# Patient Record
Sex: FEMALE | Race: WHITE | Employment: UNEMPLOYED | URBAN - METROPOLITAN AREA
[De-identification: names, ages, dates, MRNs, and addresses within clinical notes are randomized per-mention and may not be internally consistent; named-entity substitution may affect disease eponyms.]

---

## 2017-06-06 ENCOUNTER — DOCUMENTATION ONLY (OUTPATIENT)
Dept: PAIN MANAGEMENT | Age: 47
End: 2017-06-06

## 2017-06-06 NOTE — PROGRESS NOTES
Request for records from patient to send re cords to Dr. Su Park office and fax request to [employer] to be process on 06/06/2017.

## 2020-08-18 ENCOUNTER — OFFICE VISIT (OUTPATIENT)
Dept: ORTHOPEDIC SURGERY | Age: 50
End: 2020-08-18

## 2020-08-18 VITALS
DIASTOLIC BLOOD PRESSURE: 69 MMHG | SYSTOLIC BLOOD PRESSURE: 115 MMHG | BODY MASS INDEX: 22.71 KG/M2 | HEART RATE: 74 BPM | WEIGHT: 128.2 LBS | OXYGEN SATURATION: 95 % | HEIGHT: 63 IN | TEMPERATURE: 97.7 F

## 2020-08-18 DIAGNOSIS — M50.20 HNP (HERNIATED NUCLEUS PULPOSUS), CERVICAL: ICD-10-CM

## 2020-08-18 DIAGNOSIS — M54.12 CERVICAL RADICULOPATHY: Primary | ICD-10-CM

## 2020-08-18 RX ORDER — PREGABALIN 150 MG/1
150 CAPSULE ORAL 2 TIMES DAILY
Qty: 60 CAP | Refills: 2 | Status: SHIPPED | OUTPATIENT
Start: 2020-08-18 | End: 2020-09-24 | Stop reason: SDUPTHER

## 2020-08-18 RX ORDER — PROMETHAZINE HYDROCHLORIDE 12.5 MG/1
TABLET ORAL
COMMUNITY

## 2020-08-18 RX ORDER — AMITRIPTYLINE HYDROCHLORIDE 25 MG/1
75 TABLET, FILM COATED ORAL
Qty: 90 TAB | Refills: 2 | Status: SHIPPED | OUTPATIENT
Start: 2020-08-18 | End: 2020-09-21 | Stop reason: CLARIF

## 2020-08-18 RX ORDER — PREDNISONE 5 MG/1
TABLET ORAL SEE ADMIN INSTRUCTIONS
COMMUNITY
End: 2020-09-21 | Stop reason: CLARIF
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
## 2020-08-21 ENCOUNTER — OFFICE VISIT (OUTPATIENT)
Dept: ORTHOPEDIC SURGERY | Age: 50
End: 2020-08-21

## 2020-08-21 VITALS
WEIGHT: 128 LBS | RESPIRATION RATE: 16 BRPM | HEART RATE: 70 BPM | DIASTOLIC BLOOD PRESSURE: 68 MMHG | TEMPERATURE: 98.5 F | BODY MASS INDEX: 22.67 KG/M2 | SYSTOLIC BLOOD PRESSURE: 119 MMHG

## 2020-08-21 DIAGNOSIS — M48.02 CERVICAL SPINAL STENOSIS: ICD-10-CM

## 2020-08-21 DIAGNOSIS — M50.20 HNP (HERNIATED NUCLEUS PULPOSUS), CERVICAL: Primary | ICD-10-CM

## 2020-08-21 DIAGNOSIS — M54.12 CERVICAL RADICULOPATHY: ICD-10-CM

## 2020-08-21 NOTE — LETTER
8/21/20 Patient: Miguel Shankar YOB: 1970 Date of Visit: 8/21/2020 Katie Mcdonald MD 
[address] [facility] [address] [address] VIA Facsimile: [phone] Dear Katie Mcdonald MD, Thank you for referring Ms. Olimpia Fernandez to [facility] for evaluation. My notes for this consultation are attached. If you have questions, please do not hesitate to call me. I look forward to following your patient along with you.  
 
 
Sincerely, 
 
Sergei Hernandez MD

## 2020-08-21 NOTE — PATIENT INSTRUCTIONS
Stopping Smoking: Care Instructions Your Care Instructions Cigarette smokers crave the nicotine in cigarettes. Giving it up is much harder than simply changing a habit. Your body has to stop craving the nicotine. It is hard to quit, but you can do it. There are many tools that people use to quit smoking. You may find that combining tools works best for you. There are several steps to quitting. First you get ready to quit. Then you get support to help you. After that, you learn new skills and behaviors to become a nonsmoker. For many people, a necessary step is getting and using medicine. Your doctor will help you set up the plan that best meets your needs. You may want to attend a smoking cessation program to help you quit smoking. When you choose a program, look for one that has proven success. Ask your doctor for ideas. You will greatly increase your chances of success if you take medicine as well as get counseling or join a cessation program. 
Some of the changes you feel when you first quit tobacco are uncomfortable. Your body will miss the nicotine at first, and you may feel short-tempered and grumpy. You may have trouble sleeping or concentrating. Medicine can help you deal with these symptoms. You may struggle with changing your smoking habits and rituals. The last step is the tricky one: Be prepared for the smoking urge to continue for a time. This is a lot to deal with, but keep at it. You will feel better. Follow-up care is a key part of your treatment and safety. Be sure to make and go to all appointments, and call your doctor if you are having problems. It's also a good idea to know your test results and keep a list of the medicines you take. How can you care for yourself at home? · Ask your family, friends, and coworkers for support. You have a better chance of quitting if you have help and support.  
· Join a support group, such as Nicotine Anonymous, for people who are trying to quit smoking. · Consider signing up for a smoking cessation program, such as the American Lung Association's Freedom from Smoking program. 
· Get text messaging support. Go to the website at www.smokefree. gov to sign up for the [facility] program. 
· Set a quit date. Pick your date carefully so that it is not right in the middle of a big deadline or stressful time. Once you quit, do not even take a puff. Get rid of all ashtrays and lighters after your last cigarette. Clean your house and your clothes so that they do not smell of smoke. · Learn how to be a nonsmoker. Think about ways you can avoid those things that make you reach for a cigarette. ? Avoid situations that put you at greatest risk for smoking. For some people, it is hard to have a drink with friends without smoking. For others, they might skip a coffee break with coworkers who smoke. ? Change your daily routine. Take a different route to work or eat a meal in a different place. · Cut down on stress. Calm yourself or release tension by doing an activity you enjoy, such as reading a book, taking a hot bath, or gardening. · Talk to your doctor or pharmacist about nicotine replacement therapy, which replaces the nicotine in your body. You still get nicotine but you do not use tobacco. Nicotine replacement products help you slowly reduce the amount of nicotine you need. These products come in several forms, many of them available over-the-counter: ? Nicotine patches ? Nicotine gum and lozenges ? Nicotine inhaler · Ask your doctor about bupropion (Wellbutrin) or varenicline (Chantix), which are prescription medicines. They do not contain nicotine. They help you by reducing withdrawal symptoms, such as stress and anxiety. · Some people find hypnosis, acupuncture, and massage helpful for ending the smoking habit. · Eat a healthy diet and get regular exercise. Having healthy habits will help your body move past its craving for nicotine. · Be prepared to keep trying. Most people are not successful the first few times they try to quit. Do not get mad at yourself if you smoke again. Make a list of things you learned and think about when you want to try again, such as next week, next month, or next year. Where can you learn more? Go to http://davon-joanne.info/ Enter K617 in the search box to learn more about \"Stopping Smoking: Care Instructions. \" Current as of: March 12, 2020               Content Version: 12.5 © 8054-4935 [employer]. Care instructions adapted under license by [employer] (which disclaims liability or warranty for this information). If you have questions about a medical condition or this instruction, always ask your healthcare professional. [address] any warranty or liability for your use of this information. Learning About How to Have a Healthy Back What causes back pain? Back pain is often caused by overuse, strain, or injury. For example, people often hurt their backs playing sports or working in the yard, being jolted in a car accident, or lifting something too heavy. Aging plays a part too. Your bones and muscles tend to lose strength as you age, which makes injury more likely. The spongy discs between the bones of the spine (vertebrae) may suffer from wear and tear and no longer provide enough cushion between the bones. A disc that bulges or breaks open (herniated disc) can press on nerves, causing back pain. In some people, back pain is the result of arthritis, broken vertebrae caused by bone loss (osteoporosis), illness, or a spine problem. Although most people have back pain at one time or another, there are steps you can take to make it less likely. How can you have a healthy back? Reduce stress on your back through good posture Slumping or slouching alone may not cause low back pain.  But after the back has been strained or injured, bad posture can make pain worse. · Sleep in a position that maintains your back's normal curves and on a mattress that feels comfortable. Sleep on your side with a pillow between your knees, or sleep on your back with a pillow under your knees. These positions can reduce strain on your back. · Stand and sit up straight. \"Good posture\" generally means your ears, shoulders, and hips are in a straight line. · If you must stand for a long time, put one foot on a stool, ledge, or box. Switch feet every now and then. · Sit in a chair that is low enough to let you place both feet flat on the floor with both knees nearly level with your hips. If your chair or desk is too high, use a footrest to raise your knees. Place a small pillow, a rolled-up towel, or a lumbar roll in the curve of your back if you need extra support. · Try a kneeling chair, which helps tilt your hips forward. This takes pressure off your lower back. · Try sitting on an exercise ball. It can rock from side to side, which helps keep your back loose. · When driving, keep your knees nearly level with your hips. Sit straight, and drive with both hands on the steering wheel. Your arms should be in a slightly bent position. Reduce stress on your back through careful lifting · Squat down, bending at the hips and knees only. If you need to, put one knee to the floor and extend your other knee in front of you, bent at a right angle (half kneeling). · Press your chest straight forward. This helps keep your upper back straight while keeping a slight arch in your low back. · Hold the load as close to your body as possible, at the level of your belly button (navel). · Use your feet to change direction, taking small steps. · Lead with your hips as you change direction. Keep your shoulders in line with your hips as you move. · Set down your load carefully, squatting with your knees and hips only. Exercise and stretch your back · Do some exercise on most days of the week, if your doctor says it is okay. You can walk, run, swim, or cycle. · Stretch your back muscles. Here are a few exercises to try: 
? Lie on your back, and gently pull one bent knee to your chest. Put that foot back on the floor, and then pull the other knee to your chest. 
? Do pelvic tilts. Lie on your back with your knees bent. Tighten your stomach muscles. Pull your belly button (navel) in and up toward your ribs. You should feel like your back is pressing to the floor and your hips and pelvis are slightly lifting off the floor. Hold for 6 seconds while breathing smoothly. ? Sit with your back flat against a wall. · Keep your core muscles strong. The muscles of your back, belly (abdomen), and buttocks support your spine. ? Pull in your belly and imagine pulling your navel toward your spine. Hold this for 6 seconds, then relax. Remember to keep breathing normally as you tense your muscles. ? Do curl-ups. Always do them with your knees bent. Keep your low back on the floor, and curl your shoulders toward your knees using a smooth, slow motion. Keep your arms folded across your chest. If this bothers your neck, try putting your hands behind your neck (not your head), with your elbows spread apart. ? Lie on your back with your knees bent and your feet flat on the floor. Tighten your belly muscles, and then push with your feet and raise your buttocks up a few inches. Hold this position 6 seconds as you continue to breathe normally, then lower yourself slowly to the floor. Repeat 8 to 12 times. ? If you like group exercise, try Pilates or yoga. These classes have poses that strengthen the core muscles. Lead a healthy lifestyle · Stay at a healthy weight to avoid strain on your back. · Do not smoke. Smoking increases the risk of osteoporosis, which weakens the spine.  If you need help quitting, talk to your doctor about stop-smoking programs and medicines. These can increase your chances of quitting for good. Where can you learn more? Go to http://davon-joanne.info/ Enter L315 in the search box to learn more about \"Learning About How to Have a Healthy Back. \" Current as of: March 2, 2020               Content Version: 12.5 © 3823-2355 [employer]. Care instructions adapted under license by [employer] (which disclaims liability or warranty for this information). If you have questions about a medical condition or this instruction, always ask your healthcare professional. [address] any warranty or liability for your use of this information.
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
## 2020-08-24 ENCOUNTER — TELEPHONE (OUTPATIENT)
Dept: ORTHOPEDIC SURGERY | Age: 50
End: 2020-08-24

## 2020-08-24 NOTE — TELEPHONE ENCOUNTER
Pt is on lyrica and is not doing well on it. She would like something else.  She is not schedule for sx yet due to her  having to fill out financial assist paper work

## 2020-08-24 NOTE — TELEPHONE ENCOUNTER
Per pt, the Lyrica makes her feel drunk and she is in a lot of pain. She can not be scheduled for surgery yet due to not having insurance.

## 2020-08-24 NOTE — TELEPHONE ENCOUNTER
What does not doing well mean? Is she having side effects, if so what side effects? Is it just not working, if so she needs to give it more time.

## 2020-08-25 NOTE — TELEPHONE ENCOUNTER
Pt instructed to stop the Lyrica. Pt has been taking the Elavil with no relief. She has tried over the counter meds with no benefit. Her pain is excruciating. She has sent in the paperwork for financial assistance to be approved for surgery. Please advise.

## 2020-08-25 NOTE — TELEPHONE ENCOUNTER
The pt is still feeling that way from 1 pill. It has been this way since she started taking it a week ago.

## 2020-08-26 NOTE — TELEPHONE ENCOUNTER
We can try topamax ramp if she wants to try that. Can also increase elavil to 100 mg q hs. Make sure she does not have glaucoma.

## 2020-08-26 NOTE — TELEPHONE ENCOUNTER
Pt would like to try the Topamax. She would like it to go to the pharmacy on file. No history of glaucoma.

## 2020-08-27 RX ORDER — TOPIRAMATE 25 MG/1
TABLET ORAL
Qty: 90 TAB | Refills: 1 | Status: SHIPPED | OUTPATIENT
Start: 2020-08-27 | End: 2020-09-21

## 2020-09-01 ENCOUNTER — TELEPHONE (OUTPATIENT)
Dept: ORTHOPEDIC SURGERY | Age: 50
End: 2020-09-01

## 2020-09-01 NOTE — TELEPHONE ENCOUNTER
I called and spoke to the pt. The pt was identified using 2 pt identifiers. She was asked if she has taken cymbalta before. The pt confirmed that she has also tried cymbalta. Message will be routed back to the provider for review. The pt will be contacted once the provider has responded. The pt is aware.

## 2020-09-01 NOTE — TELEPHONE ENCOUNTER
[address] [address]es we can offer her a mild muscle relaxer if she would like, she is supposed to be getting surgery has she been scheduled for that yet?

## 2020-09-01 NOTE — TELEPHONE ENCOUNTER
Pt states dr Jocy Foster prescribed nerve medications and she cannot use them--reports twitching , falling dizziness.     Pt cannot tell me the names of her medications    Please call the patient to discuss at p#[phone]    Pharmacy: [facility]

## 2020-09-02 NOTE — TELEPHONE ENCOUNTER
We do not Rx opioids pre-operatively for neuropathic pain as opioids are not recommended to Tx nerve pain, the board of medicine prohibits us from doing so, we can offer her anti-neuritics in higher doses which would be Lyrica 225mg BID

## 2020-09-02 NOTE — TELEPHONE ENCOUNTER
I called and spoke to the pt. The provider's message was verbalized to the pt. She verbalized understanding and has no further questions or concerns at this time. She states that she will contact her pcp.

## 2020-09-02 NOTE — TELEPHONE ENCOUNTER
I called and spoke to Ms. Bebeto Calero. The pt was identified using 2 pt identifiers. She was informed that the provider can offer her a mild muscle relaxer if she would like. The pt states she already has flexeril. She was asked if she has scheduled her surgery yet. The pt states that she has been approved for medicaid but is waiting for her card to come in the mail. Once she gets the card number, she can call Gaurang Hernandez and get scheduled for the surgery. The pt voiced her frustration with the office and not getting back to her in timely manners. She is also upset that her pain is not being managed since it is necessary for her to have surgery. She states that the only thing that has worked for her is percocet that her pcp was giving her. They will no longer provide this to her because she has been seen here by the surgeon and they should be giving it to her. The pt was notified that her message will be forwarded to the provider for review. I spoke to the pt yesterday regarding her concerns and apologized to her if she feels like we are not getting back to her in a timely manner. She will be contacted once the provider has had a chance to review the message and respond.

## 2020-09-14 ENCOUNTER — HOSPITAL ENCOUNTER (OUTPATIENT)
Dept: LAB | Age: 50
Discharge: HOME | End: 2020-09-14

## 2020-09-14 ENCOUNTER — HOSPITAL ENCOUNTER (OUTPATIENT)
Dept: PREADMISSION TESTING | Age: 50
Discharge: HOME | End: 2020-09-14
Payer: MEDICAID

## 2020-09-14 DIAGNOSIS — M54.12 CERVICAL RADICULOPATHY: ICD-10-CM

## 2020-09-14 DIAGNOSIS — M48.02 CERVICAL SPINAL STENOSIS: ICD-10-CM

## 2020-09-14 DIAGNOSIS — M50.20 HNP (HERNIATED NUCLEUS PULPOSUS), CERVICAL: ICD-10-CM

## 2020-09-14 LAB
ALBUMIN SERPL-MCNC: 3.2 G/DL (ref 3.4–5)
ALBUMIN/GLOB SERPL: 1.1 {RATIO} (ref 0.8–1.7)
ALP SERPL-CCNC: 164 U/L (ref 45–117)
ALT SERPL-CCNC: 22 U/L (ref 13–56)
ANION GAP SERPL CALC-SCNC: 4 MMOL/L (ref 3–18)
AST SERPL-CCNC: 18 U/L (ref 10–38)
BILIRUB SERPL-MCNC: 0.2 MG/DL (ref 0.2–1)
BUN SERPL-MCNC: 10 MG/DL (ref 7–18)
BUN/CREAT SERPL: 19 (ref 12–20)
CALCIUM SERPL-MCNC: 8.5 MG/DL (ref 8.5–10.1)
CHLORIDE SERPL-SCNC: 106 MMOL/L (ref 100–111)
CO2 SERPL-SCNC: 31 MMOL/L (ref 21–32)
CREAT SERPL-MCNC: 0.53 MG/DL (ref 0.6–1.3)
ERYTHROCYTE [DISTWIDTH] IN BLOOD BY AUTOMATED COUNT: 13 % (ref 11.6–14.5)
GLOBULIN SER CALC-MCNC: 3 G/DL (ref 2–4)
GLUCOSE SERPL-MCNC: 83 MG/DL (ref 74–99)
HCT VFR BLD AUTO: 39.7 % (ref 35–45)
HGB BLD-MCNC: 13.1 G/DL (ref 12–16)
MCH RBC QN AUTO: 29.6 PG (ref 24–34)
MCHC RBC AUTO-ENTMCNC: 33 G/DL (ref 31–37)
MCV RBC AUTO: 89.8 FL (ref 74–97)
PLATELET # BLD AUTO: 188 K/UL (ref 135–420)
PMV BLD AUTO: 11.3 FL (ref 9.2–11.8)
POTASSIUM SERPL-SCNC: 3.9 MMOL/L (ref 3.5–5.5)
PROT SERPL-MCNC: 6.2 G/DL (ref 6.4–8.2)
RBC # BLD AUTO: 4.42 M/UL (ref 4.2–5.3)
SODIUM SERPL-SCNC: 141 MMOL/L (ref 136–145)
WBC # BLD AUTO: 10 K/UL (ref 4.6–13.2)

## 2020-09-14 PROCEDURE — 80053 COMPREHEN METABOLIC PANEL: CPT

## 2020-09-14 PROCEDURE — 93005 ELECTROCARDIOGRAM TRACING: CPT

## 2020-09-14 PROCEDURE — 85027 COMPLETE CBC AUTOMATED: CPT

## 2020-09-14 PROCEDURE — 36415 COLL VENOUS BLD VENIPUNCTURE: CPT

## 2020-09-15 LAB
ATRIAL RATE: 80 BPM
CALCULATED P AXIS, ECG09: 27 DEGREES
CALCULATED R AXIS, ECG10: 59 DEGREES
CALCULATED T AXIS, ECG11: 51 DEGREES
DIAGNOSIS, 93000: NORMAL
P-R INTERVAL, ECG05: 150 MS
Q-T INTERVAL, ECG07: 364 MS
QRS DURATION, ECG06: 70 MS
QTC CALCULATION (BEZET), ECG08: 419 MS
VENTRICULAR RATE, ECG03: 80 BPM

## 2020-09-18 ENCOUNTER — HOSPITAL ENCOUNTER (OUTPATIENT)
Dept: PREADMISSION TESTING | Age: 50
Discharge: HOME | End: 2020-09-18
Payer: MEDICAID

## 2020-09-18 ENCOUNTER — HOSPITAL ENCOUNTER (OUTPATIENT)
Dept: GENERAL RADIOLOGY | Age: 50
Discharge: HOME | End: 2020-09-18
Payer: MEDICAID

## 2020-09-18 DIAGNOSIS — M48.02 CERVICAL SPINAL STENOSIS: ICD-10-CM

## 2020-09-18 DIAGNOSIS — Z01.818 OTHER SPECIFIED PRE-OPERATIVE EXAMINATION: ICD-10-CM

## 2020-09-18 DIAGNOSIS — M50.20 HNP (HERNIATED NUCLEUS PULPOSUS), CERVICAL: ICD-10-CM

## 2020-09-18 DIAGNOSIS — M54.12 CERVICAL RADICULOPATHY: ICD-10-CM

## 2020-09-18 PROCEDURE — 71046 X-RAY EXAM CHEST 2 VIEWS: CPT

## 2020-09-18 PROCEDURE — 36415 COLL VENOUS BLD VENIPUNCTURE: CPT

## 2020-09-18 PROCEDURE — 87635 SARS-COV-2 COVID-19 AMP PRB: CPT

## 2020-09-19 LAB — SARS-COV-2, COV2NT: NOT DETECTED

## 2020-09-21 RX ORDER — OXYCODONE AND ACETAMINOPHEN 5; 325 MG/1; MG/1
1 TABLET ORAL 2 TIMES DAILY
COMMUNITY

## 2020-09-22 ENCOUNTER — ANESTHESIA EVENT (OUTPATIENT)
Dept: SURGERY | Age: 50
End: 2020-09-22
Payer: MEDICAID

## 2020-09-22 NOTE — H&P
Pre-Admission History and Physical    Patient: Nicol Mayorga   MRN: [id number]   SSN: [id number]   YOB: 1970   Age: 53 y.o. Sex: female     Patient scheduled for: C5/6 Disc Arthroplasty. Date of surgery: 9/23/20. [facility]  [facility]. Surgeon: Jessica Cisneros MD    HPI:  Nicol Mayorga is a 53 y.o. female with 3 months of severe progressive pain numbness and weakness in her left arm she also notes loss of balance and ambulation issues. Patient is a 1 pack a day smoker she works as a . No particular instigating trauma. Physical exam demonstrates global weakness in the left upper extremity worse in her biceps and triceps. About 3+ out of 5. No reflex change very antalgic range of motion in her neck with radiating pain with flexion or extension. MRI demonstrates a large extruded sequestered disc herniation at C5-6 on the left causing cord compression and obliteration of the lateral recess. .       She reports a pain level of 9/10. This patient has failed the presurgical conservative treatments  including medications. Pain has impacted the patient's functional ability to work, function and use her arm and she is being admitted for surgical intervention.          Past Medical History:   Diagnosis Date    Anxiety     Arthritis     Constipation     Depression 2014    Herniated disc     Poison oak 3/2013    to [facility]     Social History     Socioeconomic History    Marital status: SINGLE     Spouse name: Not on file    Number of children: Not on file    Years of education: Not on file    Highest education level: Not on file   Tobacco Use    Smoking status: Current Every Day Smoker     Packs/day: 1.00     Years: 30.00     Pack years: 30.00    Smokeless tobacco: Never Used   Substance and Sexual Activity    Alcohol use: Never     Alcohol/week: 0.0 standard drinks     Frequency: Never    Drug use: No    Sexual activity: Never   Other Topics Concern   Service No    Blood Transfusions No    Caffeine Concern No    Occupational Exposure No    Hobby Hazards No    Sleep Concern Yes    Stress Concern Yes    Weight Concern Yes    Special Diet Yes    Back Care Yes    Exercise No    Bike Helmet No    Seat Belt Yes    Self-Exams No     Past Surgical History:   Procedure Laterality Date    EXPLORATORY OF ABDOMEN      cervix x4     HX ANKLE FRACTURE TX Right     HX BREAST AUGMENTATION  1996    HX ORTHOPAEDIC      rt ankle fracture    HX OTHER SURGICAL      laproscopy     Family History   Problem Relation Age of Onset    Diabetes Mother     Headache Mother     Depression Mother     Diabetes Father     Heart Attack Father      Allergies   Allergen Reactions    Midodrine Nausea and Vomiting     PATIENT DENIES    Morphine Itching     PATIENT DENIES    Naproxen Hives     PATIENT DENIES    Oxycodone Nausea and Vomiting     Patient states she is not allergic to this medication    Pcn [Penicillins] Hives and Rash    Sulfa (Sulfonamide Antibiotics) Nausea and Vomiting     PATIENT DENIES     Current Outpatient Medications   Medication Sig Dispense Refill    oxyCODONE-acetaminophen (Percocet) 5-325 mg per tablet Take 1 Tab by mouth two (2) times a day. PRN      pregabalin (Lyrica) 150 mg capsule Take 1 Cap by mouth two (2) times a day. Max Daily Amount: 300 mg. 60 Cap 2    promethazine (PHENERGAN) 12.5 mg tablet Take  by mouth every six (6) hours as needed for Nausea. ROS:  Denies chills, fever,night sweats,  bowel or bladder dysfunction, unexplained weight loss/weight gain, chest pain, sob or anxiety.     Physical Examination    Gen: Well developed, well nourished 53 y.o. female Visit Vitals  /68 (BP 1 Location: Left arm, BP Patient Position: Sitting)   Pulse 70   Temp 98.5 °F (36.9 °C) (Temporal)   Resp 16   Wt 128 lb (58.1 kg)   BMI 22.67 kg/m²    PAIN SCALE: 9/10     CONSTITUTIONAL: The patient is in no apparent distress and is alert and oriented x 3. HEENT: Normocephalic. Hearing grossly intact. NECK: Supple and symmetric. no tenderness, or masses were felt. RESPIRATORY: No labored breathing. CARDIOVASCULAR: The carotid pulses were normal. Peripheral pulses were 2+. CHEST: Normal AP diameter and normal contour without any kyphoscoliosis. LYMPHATIC: No lymphadenopathy was appreciated in the neck, axillae or groin. SKIN:  Negative for scars, rashes, lesions, or ulcers on the right upper, right lower, left upper, left lower and trunk. NEUROLOGICAL: Alert and oriented x 3. Ambulation without assistive device. FWB. Imbalance. EXTREMITIES:  See musculoskeletal.  MUSCULOSKELETAL:  · Head and Neck: Neck pain radiating to LUE. Painful ROM. Negative for misalignment, asymmetry, crepitation, defects, tenderness masses or effusions. · Left Upper Extremity: Anterior arm pain. Weakness in . 3+ weakness biceps and triceps. Inspection, percussion and palpation performed. [place]s sign is negative. · Right Upper Extremity: Inspection, percussion and palpation performed. [place]s sign is negative. · Spine, Ribs and Pelvis: Inspection, percussion and palpation performed. Negative for misalignment, asymmetry, crepitation, defects, tenderness masses or effusions. · Left Lower Extremity: Inspection, percussion and palpation performed. Negative straight leg raise. · Right Lower Extremity: Inspection, percussion and palpation performed. Negative straight leg raise.        SPINE EXAM:      Cervical spine: Neck is midline. Normal muscle tone. No focal atrophy is noted.     Lumbar spine: No rash, ecchymosis, or gross obliquity. No focal atrophy is noted    Assessment and Plan    Due to the pt's persistent symptoms unrelieved by conservative measure Landy Guzmán is being [facility] undergo [facility] intervention.  The post-operative plan of care consists of physical therapy, home health and a 2 week f/u office visit. We are pending medical clearance by Dr. Maurilio Merchant. The risks, benefits, complications and alternatives to surgery have been discussed in detail with the patient. The patient understands and agrees to proceed.      Jonna Shannon NP-BC dictating for Murel Duane, MD

## 2020-09-23 ENCOUNTER — APPOINTMENT (OUTPATIENT)
Dept: GENERAL RADIOLOGY | Age: 50
End: 2020-09-23
Attending: ORTHOPAEDIC SURGERY
Payer: MEDICAID

## 2020-09-23 ENCOUNTER — HOSPITAL ENCOUNTER (OUTPATIENT)
Age: 50
Setting detail: OUTPATIENT SURGERY
Discharge: HOME | End: 2020-09-23
Attending: ORTHOPAEDIC SURGERY | Admitting: ORTHOPAEDIC SURGERY
Payer: MEDICAID

## 2020-09-23 ENCOUNTER — ANESTHESIA (OUTPATIENT)
Dept: SURGERY | Age: 50
End: 2020-09-23
Payer: MEDICAID

## 2020-09-23 VITALS
WEIGHT: 120 LBS | RESPIRATION RATE: 16 BRPM | SYSTOLIC BLOOD PRESSURE: 101 MMHG | HEIGHT: 63 IN | TEMPERATURE: 97.1 F | HEART RATE: 68 BPM | DIASTOLIC BLOOD PRESSURE: 69 MMHG | BODY MASS INDEX: 21.26 KG/M2 | OXYGEN SATURATION: 95 %

## 2020-09-23 DIAGNOSIS — Z98.890 S/P CERVICAL DISC REPLACEMENT: Primary | ICD-10-CM

## 2020-09-23 LAB — HCG UR QL: NEGATIVE

## 2020-09-23 PROCEDURE — 74011250637 HC RX REV CODE- 250/637: Performed by: ORTHOPAEDIC SURGERY

## 2020-09-23 PROCEDURE — 74011000250 HC RX REV CODE- 250: Performed by: NURSE ANESTHETIST, CERTIFIED REGISTERED

## 2020-09-23 PROCEDURE — 76010000149 HC OR TIME 1 TO 1.5 HR: Performed by: ORTHOPAEDIC SURGERY

## 2020-09-23 PROCEDURE — 74011250636 HC RX REV CODE- 250/636: Performed by: NURSE ANESTHETIST, CERTIFIED REGISTERED

## 2020-09-23 PROCEDURE — 74011000636 HC RX REV CODE- 636: Performed by: ORTHOPAEDIC SURGERY

## 2020-09-23 PROCEDURE — 77030027138 HC INCENT SPIROMETER -A: Performed by: ORTHOPAEDIC SURGERY

## 2020-09-23 PROCEDURE — C1713 ANCHOR/SCREW BN/BN,TIS/BN: HCPCS | Performed by: ORTHOPAEDIC SURGERY

## 2020-09-23 PROCEDURE — 77030030163 HC BN WAX J&J -A: Performed by: ORTHOPAEDIC SURGERY

## 2020-09-23 PROCEDURE — 77030003029 HC SUT VCRL J&J -B: Performed by: ORTHOPAEDIC SURGERY

## 2020-09-23 PROCEDURE — 00600 ANES PX CRV SPINE&CORD NOS: CPT | Performed by: ANESTHESIOLOGY

## 2020-09-23 PROCEDURE — 74011000258 HC RX REV CODE- 258: Performed by: NURSE ANESTHETIST, CERTIFIED REGISTERED

## 2020-09-23 PROCEDURE — 77030011265 HC ELECTRD BLD HEX COVD -A: Performed by: ORTHOPAEDIC SURGERY

## 2020-09-23 PROCEDURE — 74011000272 HC RX REV CODE- 272: Performed by: ORTHOPAEDIC SURGERY

## 2020-09-23 PROCEDURE — 74011000250 HC RX REV CODE- 250: Performed by: ORTHOPAEDIC SURGERY

## 2020-09-23 PROCEDURE — 77030013079 HC BLNKT BAIR HGGR 3M -A: Performed by: ANESTHESIOLOGY

## 2020-09-23 PROCEDURE — L0120 CERV FLEX N/ADJ FOAM PRE OTS: HCPCS | Performed by: ORTHOPAEDIC SURGERY

## 2020-09-23 PROCEDURE — 77030002933 HC SUT MCRYL J&J -A: Performed by: ORTHOPAEDIC SURGERY

## 2020-09-23 PROCEDURE — 76210000000 HC OR PH I REC 2 TO 2.5 HR: Performed by: ORTHOPAEDIC SURGERY

## 2020-09-23 PROCEDURE — 74011250636 HC RX REV CODE- 250/636

## 2020-09-23 PROCEDURE — 77030008683 HC TU ET CUF COVD -A: Performed by: ANESTHESIOLOGY

## 2020-09-23 PROCEDURE — 76210000020 HC REC RM PH II FIRST 0.5 HR: Performed by: ORTHOPAEDIC SURGERY

## 2020-09-23 PROCEDURE — 74011250637 HC RX REV CODE- 250/637: Performed by: NURSE ANESTHETIST, CERTIFIED REGISTERED

## 2020-09-23 PROCEDURE — 77030039266 HC ADH SKN EXOFIN S2SG -A: Performed by: ORTHOPAEDIC SURGERY

## 2020-09-23 PROCEDURE — 77030011267 HC ELECTRD BLD COVD -A: Performed by: ORTHOPAEDIC SURGERY

## 2020-09-23 PROCEDURE — 77030019908 HC STETH ESOPH SIMS -A: Performed by: ANESTHESIOLOGY

## 2020-09-23 PROCEDURE — 77030040361 HC SLV COMPR DVT MDII -B: Performed by: ORTHOPAEDIC SURGERY

## 2020-09-23 PROCEDURE — 77030040922 HC BLNKT HYPOTHRM STRY -A: Performed by: ORTHOPAEDIC SURGERY

## 2020-09-23 PROCEDURE — 77030012890: Performed by: ORTHOPAEDIC SURGERY

## 2020-09-23 PROCEDURE — 81025 URINE PREGNANCY TEST: CPT

## 2020-09-23 PROCEDURE — 74011250636 HC RX REV CODE- 250/636: Performed by: NURSE PRACTITIONER

## 2020-09-23 PROCEDURE — 76060000033 HC ANESTHESIA 1 TO 1.5 HR: Performed by: ORTHOPAEDIC SURGERY

## 2020-09-23 PROCEDURE — 2709999900 HC NON-CHARGEABLE SUPPLY: Performed by: ORTHOPAEDIC SURGERY

## 2020-09-23 PROCEDURE — 77030014005 HC SPNG HEMSTAT GEL CARD -B: Performed by: ORTHOPAEDIC SURGERY

## 2020-09-23 DEVICE — DISC ARTIFICIAL W13XH5XL15MM CERV CO CHROM MOLYBDENUM ALLY: Type: IMPLANTABLE DEVICE | Site: SPINE CERVICAL | Status: FUNCTIONAL

## 2020-09-23 RX ORDER — DIPHENHYDRAMINE HYDROCHLORIDE 50 MG/ML
12.5 INJECTION, SOLUTION INTRAMUSCULAR; INTRAVENOUS
Status: DISCONTINUED | OUTPATIENT
Start: 2020-09-23 | End: 2020-09-23 | Stop reason: HOSPADM

## 2020-09-23 RX ORDER — ROCURONIUM BROMIDE 10 MG/ML
INJECTION, SOLUTION INTRAVENOUS AS NEEDED
Status: DISCONTINUED | OUTPATIENT
Start: 2020-09-23 | End: 2020-09-23 | Stop reason: HOSPADM

## 2020-09-23 RX ORDER — SUCCINYLCHOLINE CHLORIDE 20 MG/ML
INJECTION INTRAMUSCULAR; INTRAVENOUS AS NEEDED
Status: DISCONTINUED | OUTPATIENT
Start: 2020-09-23 | End: 2020-09-23 | Stop reason: HOSPADM

## 2020-09-23 RX ORDER — FENTANYL CITRATE 50 UG/ML
50 INJECTION, SOLUTION INTRAMUSCULAR; INTRAVENOUS AS NEEDED
Status: DISCONTINUED | OUTPATIENT
Start: 2020-09-23 | End: 2020-09-23 | Stop reason: HOSPADM

## 2020-09-23 RX ORDER — SODIUM CHLORIDE 0.9 % (FLUSH) 0.9 %
5-40 SYRINGE (ML) INJECTION EVERY 8 HOURS
Status: DISCONTINUED | OUTPATIENT
Start: 2020-09-23 | End: 2020-09-23 | Stop reason: HOSPADM

## 2020-09-23 RX ORDER — OXYCODONE HYDROCHLORIDE 5 MG/1
5 TABLET ORAL
Status: COMPLETED | OUTPATIENT
Start: 2020-09-23 | End: 2020-09-23

## 2020-09-23 RX ORDER — SODIUM CHLORIDE 0.9 % (FLUSH) 0.9 %
5-40 SYRINGE (ML) INJECTION AS NEEDED
Status: DISCONTINUED | OUTPATIENT
Start: 2020-09-23 | End: 2020-09-23 | Stop reason: HOSPADM

## 2020-09-23 RX ORDER — GLYCOPYRROLATE 0.2 MG/ML
INJECTION INTRAMUSCULAR; INTRAVENOUS AS NEEDED
Status: DISCONTINUED | OUTPATIENT
Start: 2020-09-23 | End: 2020-09-23 | Stop reason: HOSPADM

## 2020-09-23 RX ORDER — FAMOTIDINE 20 MG/1
20 TABLET, FILM COATED ORAL ONCE
Status: COMPLETED | OUTPATIENT
Start: 2020-09-23 | End: 2020-09-23

## 2020-09-23 RX ORDER — DEXAMETHASONE SODIUM PHOSPHATE 4 MG/ML
INJECTION, SOLUTION INTRA-ARTICULAR; INTRALESIONAL; INTRAMUSCULAR; INTRAVENOUS; SOFT TISSUE AS NEEDED
Status: DISCONTINUED | OUTPATIENT
Start: 2020-09-23 | End: 2020-09-23 | Stop reason: HOSPADM

## 2020-09-23 RX ORDER — NEOSTIGMINE METHYLSULFATE 1 MG/ML
INJECTION, SOLUTION INTRAVENOUS AS NEEDED
Status: DISCONTINUED | OUTPATIENT
Start: 2020-09-23 | End: 2020-09-23 | Stop reason: HOSPADM

## 2020-09-23 RX ORDER — DEXMEDETOMIDINE HYDROCHLORIDE 4 UG/ML
INJECTION, SOLUTION INTRAVENOUS AS NEEDED
Status: DISCONTINUED | OUTPATIENT
Start: 2020-09-23 | End: 2020-09-23 | Stop reason: HOSPADM

## 2020-09-23 RX ORDER — ONDANSETRON 2 MG/ML
4 INJECTION INTRAMUSCULAR; INTRAVENOUS ONCE
Status: COMPLETED | OUTPATIENT
Start: 2020-09-23 | End: 2020-09-23

## 2020-09-23 RX ORDER — OXYCODONE HYDROCHLORIDE 5 MG/1
5 TABLET ORAL
Qty: 20 TAB | Refills: 0 | Status: SHIPPED | OUTPATIENT
Start: 2020-09-23 | End: 2020-09-29 | Stop reason: SDUPTHER

## 2020-09-23 RX ORDER — ONDANSETRON 2 MG/ML
INJECTION INTRAMUSCULAR; INTRAVENOUS AS NEEDED
Status: DISCONTINUED | OUTPATIENT
Start: 2020-09-23 | End: 2020-09-23 | Stop reason: HOSPADM

## 2020-09-23 RX ORDER — PROPOFOL 10 MG/ML
INJECTION, EMULSION INTRAVENOUS AS NEEDED
Status: DISCONTINUED | OUTPATIENT
Start: 2020-09-23 | End: 2020-09-23 | Stop reason: HOSPADM

## 2020-09-23 RX ORDER — KETAMINE HCL 50MG/ML(1)
SYRINGE (ML) INTRAVENOUS AS NEEDED
Status: DISCONTINUED | OUTPATIENT
Start: 2020-09-23 | End: 2020-09-23 | Stop reason: HOSPADM

## 2020-09-23 RX ORDER — LIDOCAINE HYDROCHLORIDE 20 MG/ML
INJECTION, SOLUTION EPIDURAL; INFILTRATION; INTRACAUDAL; PERINEURAL AS NEEDED
Status: DISCONTINUED | OUTPATIENT
Start: 2020-09-23 | End: 2020-09-23 | Stop reason: HOSPADM

## 2020-09-23 RX ORDER — HYDROMORPHONE HYDROCHLORIDE 2 MG/ML
0.5 INJECTION, SOLUTION INTRAMUSCULAR; INTRAVENOUS; SUBCUTANEOUS
Status: COMPLETED | OUTPATIENT
Start: 2020-09-23 | End: 2020-09-23

## 2020-09-23 RX ORDER — LIDOCAINE HYDROCHLORIDE 10 MG/ML
0.1 INJECTION, SOLUTION EPIDURAL; INFILTRATION; INTRACAUDAL; PERINEURAL AS NEEDED
Status: DISCONTINUED | OUTPATIENT
Start: 2020-09-23 | End: 2020-09-23 | Stop reason: HOSPADM

## 2020-09-23 RX ORDER — SODIUM CHLORIDE, SODIUM LACTATE, POTASSIUM CHLORIDE, CALCIUM CHLORIDE 600; 310; 30; 20 MG/100ML; MG/100ML; MG/100ML; MG/100ML
75 INJECTION, SOLUTION INTRAVENOUS CONTINUOUS
Status: DISCONTINUED | OUTPATIENT
Start: 2020-09-23 | End: 2020-09-23 | Stop reason: HOSPADM

## 2020-09-23 RX ADMIN — ROCURONIUM BROMIDE 20 MG: 50 INJECTION INTRAVENOUS at 11:56

## 2020-09-23 RX ADMIN — HYDROMORPHONE HYDROCHLORIDE 0.5 MG: 2 INJECTION, SOLUTION INTRAMUSCULAR; INTRAVENOUS; SUBCUTANEOUS at 14:08

## 2020-09-23 RX ADMIN — FENTANYL CITRATE 50 MCG: 50 INJECTION, SOLUTION INTRAMUSCULAR; INTRAVENOUS at 13:50

## 2020-09-23 RX ADMIN — DEXMEDETOMIDINE HYDROCHLORIDE 6 MCG: 4 INJECTION, SOLUTION INTRAVENOUS at 11:53

## 2020-09-23 RX ADMIN — GLYCOPYRROLATE 0.4 MG: 0.2 INJECTION INTRAMUSCULAR; INTRAVENOUS at 12:32

## 2020-09-23 RX ADMIN — DEXMEDETOMIDINE HYDROCHLORIDE 6 MCG: 4 INJECTION, SOLUTION INTRAVENOUS at 12:29

## 2020-09-23 RX ADMIN — PHENYLEPHRINE HYDROCHLORIDE 100 MCG: 10 INJECTION INTRAVENOUS at 12:23

## 2020-09-23 RX ADMIN — SODIUM CHLORIDE 1 G: 900 INJECTION, SOLUTION INTRAVENOUS at 11:54

## 2020-09-23 RX ADMIN — DEXMEDETOMIDINE HYDROCHLORIDE 14 MCG: 4 INJECTION, SOLUTION INTRAVENOUS at 12:37

## 2020-09-23 RX ADMIN — Medication 50 MG: at 11:48

## 2020-09-23 RX ADMIN — HYDROMORPHONE HYDROCHLORIDE 0.5 MG: 2 INJECTION, SOLUTION INTRAMUSCULAR; INTRAVENOUS; SUBCUTANEOUS at 13:48

## 2020-09-23 RX ADMIN — Medication 3 MG: at 12:32

## 2020-09-23 RX ADMIN — HYDROMORPHONE HYDROCHLORIDE 0.5 MG: 2 INJECTION, SOLUTION INTRAMUSCULAR; INTRAVENOUS; SUBCUTANEOUS at 13:38

## 2020-09-23 RX ADMIN — DEXMEDETOMIDINE HYDROCHLORIDE 10 MCG: 4 INJECTION, SOLUTION INTRAVENOUS at 11:48

## 2020-09-23 RX ADMIN — DEXMEDETOMIDINE HYDROCHLORIDE 10 MCG: 4 INJECTION, SOLUTION INTRAVENOUS at 12:31

## 2020-09-23 RX ADMIN — PROPOFOL 120 MG: 10 INJECTION, EMULSION INTRAVENOUS at 11:48

## 2020-09-23 RX ADMIN — SODIUM CHLORIDE 1000 MG: 900 INJECTION, SOLUTION INTRAVENOUS at 11:21

## 2020-09-23 RX ADMIN — ONDANSETRON 4 MG: 2 INJECTION INTRAMUSCULAR; INTRAVENOUS at 11:48

## 2020-09-23 RX ADMIN — DEXMEDETOMIDINE HYDROCHLORIDE 10 MCG: 4 INJECTION, SOLUTION INTRAVENOUS at 11:59

## 2020-09-23 RX ADMIN — SUCCINYLCHOLINE CHLORIDE 120 MG: 20 INJECTION, SOLUTION INTRAMUSCULAR; INTRAVENOUS at 11:48

## 2020-09-23 RX ADMIN — DEXAMETHASONE SODIUM PHOSPHATE 4 MG: 4 INJECTION, SOLUTION INTRAMUSCULAR; INTRAVENOUS at 11:48

## 2020-09-23 RX ADMIN — ONDANSETRON 4 MG: 2 INJECTION INTRAMUSCULAR; INTRAVENOUS at 13:46

## 2020-09-23 RX ADMIN — LIDOCAINE HYDROCHLORIDE 100 MG: 20 INJECTION, SOLUTION EPIDURAL; INFILTRATION; INTRACAUDAL; PERINEURAL at 11:48

## 2020-09-23 RX ADMIN — DEXMEDETOMIDINE HYDROCHLORIDE 12 MCG: 4 INJECTION, SOLUTION INTRAVENOUS at 12:01

## 2020-09-23 RX ADMIN — OXYCODONE HYDROCHLORIDE 5 MG: 5 TABLET ORAL at 14:49

## 2020-09-23 RX ADMIN — DEXMEDETOMIDINE HYDROCHLORIDE 10 MCG: 4 INJECTION, SOLUTION INTRAVENOUS at 11:39

## 2020-09-23 RX ADMIN — FENTANYL CITRATE 50 MCG: 50 INJECTION, SOLUTION INTRAMUSCULAR; INTRAVENOUS at 13:44

## 2020-09-23 RX ADMIN — PROMETHAZINE HYDROCHLORIDE 12.5 MG: 25 INJECTION INTRAMUSCULAR; INTRAVENOUS at 11:59

## 2020-09-23 RX ADMIN — HYDROMORPHONE HYDROCHLORIDE 0.5 MG: 2 INJECTION, SOLUTION INTRAMUSCULAR; INTRAVENOUS; SUBCUTANEOUS at 13:58

## 2020-09-23 RX ADMIN — SODIUM CHLORIDE, SODIUM LACTATE, POTASSIUM CHLORIDE, AND CALCIUM CHLORIDE 75 ML/HR: 600; 310; 30; 20 INJECTION, SOLUTION INTRAVENOUS at 11:20

## 2020-09-23 RX ADMIN — DEXMEDETOMIDINE HYDROCHLORIDE 6 MCG: 4 INJECTION, SOLUTION INTRAVENOUS at 12:06

## 2020-09-23 RX ADMIN — DEXMEDETOMIDINE HYDROCHLORIDE 6 MCG: 4 INJECTION, SOLUTION INTRAVENOUS at 12:02

## 2020-09-23 RX ADMIN — FAMOTIDINE 20 MG: 20 TABLET ORAL at 11:21

## 2020-09-23 NOTE — INTERVAL H&P NOTE
Update History & Physical 
 
The Patient's History and Physical of September 22, 2020 was reviewed with the patient and I examined the patient. There was no change. The surgical site was confirmed by the patient and me. Plan:  The risk, benefits, expected outcome, and alternative to the recommended procedure have been discussed with the patient. Patient understands and wants to proceed with the procedure.  
 
Electronically signed by Luis Manzano MD on 9/23/2020 at 10:55 AM

## 2020-09-23 NOTE — PROGRESS NOTES
OR Today C5/6 Disc Arthroplasty  Hx: anxiety    VSS, LS clear, Apical pulse regular  Dressing clean, dry and intact, soft collar  UA: voided 400cc  PT:up  Neuro  Intact. Having a lot of neck pain now. Had LUE pain before surgery, denies any arm pain right now. She has pain inhibited weakness that is generalized. Pain management will likely be an issue for her. Swallowing fluids ok now. Moving all extremities. Plan: I reviewed DOs and DONTs, wound care, activity restrictions and fluid intake, raising HOB.  Pt to be DC-home    Davonte Saha, NP

## 2020-09-23 NOTE — ANESTHESIA POSTPROCEDURE EVALUATION
Procedure(s):  C5/6 CERVICAL DISC ARTHROPLASTY//C-ARM/MOBIC.    general    Anesthesia Post Evaluation      Multimodal analgesia: multimodal analgesia used between 6 hours prior to anesthesia start to PACU discharge  Patient location during evaluation: PACU  Patient participation: complete - patient participated  Level of consciousness: awake and alert  Pain management: adequate  Airway patency: patent  Anesthetic complications: no  Cardiovascular status: acceptable  Respiratory status: acceptable  Hydration status: acceptable  Post anesthesia nausea and vomiting:  controlled  Final Post Anesthesia Temperature Assessment:  Normothermia (36.0-37.5 degrees C)      INITIAL Post-op Vital signs:   Vitals Value Taken Time   BP 99/61 9/23/2020  1:41 PM   Temp 37.3 °C (99.1 °F) 9/23/2020 12:50 PM   Pulse 70 9/23/2020  1:48 PM   Resp 13 9/23/2020  1:48 PM   SpO2 93 % 9/23/2020  1:48 PM   Vitals shown include unvalidated device data.

## 2020-09-23 NOTE — BRIEF OP NOTE
Brief Postoperative Note    Patient: Yuki Hamlin  YOB: 1970  MRN: [id number]    Date of Procedure: 9/23/2020     Pre-Op Diagnosis: HNP (herniated nucleus pulposus), cervical [M50.20]    Post-Op Diagnosis: Same as preoperative diagnosis. Procedure(s):  C5/6 CERVICAL DISC ARTHROPLASTY//C-ARM/MOBIC    Surgeon(s):  Shakir Noel MD    Surgical Assistant: None    Anesthesia: General     Estimated Blood Loss (mL): Minimal    Complications: None    Specimens: * No specimens in log *     Implants:   Implant Name Type Inv.  Item Serial No.  Lot No. LRB No. Used Action   DISC ARTIFICIAL [id number] CERV CO CHROM MOLYBDENUM ALLY - [id number]  [employer] ARTIFICIAL [id number] CERV CO CHROM MOLYBDENUM ALLY  Mariann Cordoba [id number] N/A 1 Implanted       Drains: * No LDAs found *    Findings: large hnp    Electronically Signed by Marvin Clifford MD on 9/23/2020 at 12:36 PM

## 2020-09-23 NOTE — DISCHARGE INSTRUCTIONS
DISCHARGE SUMMARY from Nurse  PATIENT INSTRUCTIONS:  After general anesthesia or intravenous sedation, for 24 hours or while taking prescription Narcotics:  · Limit your activities  · Do not drive and operate hazardous machinery  · Do not make important personal or business decisions  · Do  not drink alcoholic beverages  · If you have not urinated within 8 hours after discharge, please contact your surgeon on call. Report the following to your surgeon:  · Excessive pain, swelling, redness or odor of or around the surgical area  · Temperature over 100.5  · Nausea and vomiting lasting longer than 4 hours or if unable to take medications  · Any signs of decreased circulation or nerve impairment to extremity: change in color, persistent  numbness, tingling, coldness or increase pain  · Any questions    What to do at Home:  Recommended activity: Activity as tolerated and no driving for today. *  Please give a list of your current medications to your Primary Care Provider. *  Please update this list whenever your medications are discontinued, doses are      changed, or new medications (including over-the-counter products) are added. *  Please carry medication information at all times in case of emergency situations. These are general instructions for a healthy lifestyle:    No smoking/ No tobacco products/ Avoid exposure to second hand smoke  Surgeon General's Warning:  Quitting smoking now greatly reduces serious risk to your health. Obesity, smoking, and sedentary lifestyle greatly increases your risk for illness    A healthy diet, regular physical exercise & weight monitoring are important for maintaining a healthy lifestyle    You may be retaining fluid if you have a history of heart failure or if you experience any of the following symptoms:  Weight gain of 3 pounds or more overnight or 5 pounds in a week, increased swelling in our hands or feet or shortness of breath while lying flat in bed.   Please call your doctor as soon as you notice any of these symptoms; do not wait until your next office visit. The discharge information has been reviewed with the patient. The patient verbalized understanding. Discharge medications reviewed with the patient and appropriate educational materials and side effects teaching were provided. ___________________________________________________________________________________________________________________________________    Patient Education   Cervical Discectomy: What to Expect at Home  Your Recovery     The cervical discectomy took out damaged tissue from the discs in the neck area of your spine. The surgery took pressure off your nerves. You can expect your neck to feel stiff or sore. This should improve in the weeks after surgery. You may have trouble sitting or standing in one position for very long and may need pain medicine in the weeks after your surgery. It may take up to 8 weeks to get back to your usual activities. How long it takes may depend on what kind of surgery you had. Your doctor may advise you to work with a physical therapist to strengthen the muscles around your neck and back. This care sheet gives you a general idea about how long it will take for you to recover. But each person recovers at a different pace. Follow the steps below to get better as quickly as possible. How can you care for yourself at home? Activity    · Rest when you feel tired. Getting enough sleep will help you recover.     · Try to walk each day. Start by walking a little more than you did the day before. Bit by bit, increase the amount you walk. Walking boosts blood flow and helps prevent pneumonia and constipation. Walking may also decrease your muscle soreness after surgery.     · Avoid lifting anything that would make you strain.  This may include grocery bags and milk containers, a heavy briefcase or backpack, cat litter or dog food bags, a vacuum , or a child.     · Avoid strenuous activities, such as bicycle riding, jogging, weightlifting, or aerobic exercise, until your doctor says it is okay.     · Ask your doctor when you can drive again.     · Avoid taking long car trips for 2 to 4 weeks after surgery. Your neck may become tired and painful from sitting too long in one position.     · Your time off from work depends on how quickly you feel better and on the type of work you do. If you work in an office, you likely can go back to work sooner than if you have a job where you are very active. Talk with your doctor about your work needs.     · You may have sex as soon as you feel able, but avoid positions that put stress on your neck or cause pain. Diet    · You can eat your normal diet. If your stomach is upset, try bland, low-fat foods like plain rice, broiled chicken, toast, and yogurt.     · Drink plenty of fluids (unless your doctor tells you not to).     · You may notice that your bowel movements are not regular right after your surgery. This is common. Try to avoid constipation and straining with bowel movements. You may want to take a fiber supplement every day. If you have not had a bowel movement after a couple of days, ask your doctor about taking a mild laxative. Medicines    · Be safe with medicines. Take pain medicines exactly as directed. ? If the doctor gave you a prescription medicine for pain, take it as prescribed. ? If you are not taking a prescription pain medicine, ask your doctor if you can take an over-the-counter medicine.     · If your doctor prescribed antibiotics, take them as directed. Do not stop taking them just because you feel better. You need to take the full course of antibiotics.     · If you think your pain medicine is making you sick to your stomach:  ? Take your medicine after meals (unless your doctor has told you not to). ? Ask your doctor for a different pain medicine.    Incision care    · If you have strips of tape on the cut (incision) the doctor made, leave the tape on for a week or until it falls off.     · Wash the area daily with warm, soapy water, and pat it dry. Don't use hydrogen peroxide or alcohol, which can slow healing. You may cover the area with a gauze bandage if it weeps or rubs against clothing. Change the dressing every day.     · Keep the area clean and dry. Exercise    · Do exercises as instructed by your doctor or physical therapist to improve your strength and flexibility. Other instructions    · Follow your doctor's instructions if you are told to wear a brace or collar to support your neck.     · To reduce stiffness and help sore muscles, use a warm water bottle, a heating pad set on low, or a warm cloth on your neck. Do not put heat right over the incision. Do not go to sleep with a heating pad on your skin. Follow-up care is a key part of your treatment and safety. Be sure to make and go to all appointments, and call your doctor if you are having problems. It's also a good idea to know your test results and keep a list of the medicines you take. When should you call for help? Call 911 anytime you think you may need emergency care. For example, call if:    · You passed out (lost consciousness).     · You have chest pain, are short of breath, or cough up blood.     · You are unable to move an arm or a leg at all. Call your doctor now or seek immediate medical care if:    · You have pain that does not get better after you take pain medicine.     · You have loose stitches, or your incision comes open.     · Bright red blood has soaked through the bandage over your incision.     · You have signs of a blood clot in your leg (called a deep vein thrombosis), such as:  ? Pain in your calf, back of the knee, thigh, or groin. ? Redness or swelling in your leg.     · You have signs of infection, such as:  ? Increased pain, swelling, warmth, or redness. ? Red streaks leading from the incision.   ? Pus draining from the incision. ? A fever.     · You have new or worse symptoms in your arms, legs, chest, belly, or buttocks. Symptoms may include:  ? Numbness or tingling. ? Weakness. ? Pain.     · You lose bladder or bowel control. Watch closely for any changes in your health, and be sure to contact your doctor if:    · You do not get better as expected. Where can you learn more? Go to http://davon-joanne.info/  Enter R489 in the search box to learn more about \"Cervical Discectomy: What to Expect at Home. \"  Current as of: March 2, 2020               Content Version: 12.6  © 1350-7327 [employer], [employer]. Care instructions adapted under license by [employer] (which disclaims liability or warranty for this information). If you have questions about a medical condition or this instruction, always ask your healthcare professional. [address] any warranty or liability for your use of this information.

## 2020-09-23 NOTE — ANESTHESIA PREPROCEDURE EVALUATION
Relevant Problems   No relevant active problems       Anesthetic History     PONV          Review of Systems / Medical History  Patient summary reviewed and pertinent labs reviewed    Pulmonary  Within defined limits                 Neuro/Psych         Psychiatric history    Comments: depression Cardiovascular  Within defined limits                     GI/Hepatic/Renal  Within defined limits              Endo/Other        Arthritis     Other Findings              Physical Exam    Airway  Mallampati: II  TM Distance: 4 - 6 cm  Neck ROM: decreased range of motion   Mouth opening: Normal     Cardiovascular  Regular rate and rhythm,  S1 and S2 normal,  no murmur, click, rub, or gallop             Dental    Dentition: Full upper dentures     Pulmonary  Breath sounds clear to auscultation               Abdominal  GI exam deferred       Other Findings            Anesthetic Plan    ASA: 2  Anesthesia type: general          Induction: Intravenous  Anesthetic plan and risks discussed with: Patient

## 2020-09-24 ENCOUNTER — TELEPHONE (OUTPATIENT)
Dept: ORTHOPEDIC SURGERY | Age: 50
End: 2020-09-24

## 2020-09-24 DIAGNOSIS — M54.12 CERVICAL RADICULOPATHY: ICD-10-CM

## 2020-09-24 DIAGNOSIS — M50.20 HNP (HERNIATED NUCLEUS PULPOSUS), CERVICAL: ICD-10-CM

## 2020-09-24 RX ORDER — METHYLPREDNISOLONE 4 MG/1
TABLET ORAL
Qty: 1 DOSE PACK | Refills: 0 | Status: SHIPPED | OUTPATIENT
Start: 2020-09-24 | End: 2020-10-21 | Stop reason: ALTCHOICE

## 2020-09-24 RX ORDER — PREGABALIN 150 MG/1
150 CAPSULE ORAL 2 TIMES DAILY
Qty: 60 CAP | Refills: 2 | Status: SHIPPED | OUTPATIENT
Start: 2020-09-24

## 2020-09-24 RX ORDER — HYDROMORPHONE HYDROCHLORIDE 2 MG/1
2 TABLET ORAL
Qty: 6 TAB | Refills: 0 | Status: SHIPPED | OUTPATIENT
Start: 2020-09-24 | End: 2020-09-26

## 2020-09-24 NOTE — TELEPHONE ENCOUNTER
Spoke with patient. Crying. In tears. States she doesn't tolerate pain. Has been taking 2 pian pills since yesterday. Was on pain medication pre op. discussed pain control would be tough. 6 tabs dilaudid sent in, transition back to normal post op rx after these are done. Use heat. DME for ESTIM. Resume lyrica. MDP for pain.

## 2020-09-24 NOTE — TELEPHONE ENCOUNTER
Post op cerv disc arthroplasty/c-arm    Pt reports severe pain     States she called the answering service last night and was told she'll have to wait until the morning to talk with anyone    Please advise [phone]

## 2020-09-24 NOTE — TELEPHONE ENCOUNTER
Luciano Andrade   Patient [phone]  Patient states her pain medicine is not helping her discomfort at all. She is requesting something different.

## 2020-09-24 NOTE — OP NOTES
[facility]  OPERATIVE REPORT    Name:  Damaris Senior  MR#:   [id number]  :  1970  ACCOUNT #:  [de-identified]  DATE OF SERVICE:  2020    PREOPERATIVE DIAGNOSIS:  Herniated nucleus pulposus, C5-6. POSTOPERATIVE DIAGNOSIS:  Herniated nucleus pulposus, C5-6. PROCEDURES PERFORMED:  Anterior cervical diskectomy and cervical disk arthroplasty, C5-6 with Mobi-C device. SURGEON:  Artemio Grant MD    ASSISTANT:  None. ANESTHESIA:  General endotracheal.    COMPLICATIONS:  None. SPECIMENS REMOVED:  None. IMPLANTS:  Mobi-C cervical disk arthroplasty. ESTIMATED BLOOD LOSS:  5 mL. FINDINGS:  The patient had extruded disk herniation in left paracentral position at C5-6. PROCEDURE:  Following induction of general endotracheal anesthesia, the patient was placed with the head in neutral position in a cervical troy. The patient was prepped and draped in the usual fashion. Right-sided approach was utilized. Sternocleidomastoid and great vessels were mobilized laterally, esophagus and trachea mobilized medially with blunt finger dissection. Prevertebral fascia was entered. C-arm image verified the surgical level. Burgin pins were placed in the midline orthogonal to the disk space. Cloward self-retaining retractor was placed under the longus colli musculature bilaterally. Adenoid tissue was incised, and radical diskectomy was done back to the posterior margin. Posterior longitudinal ligament defined, and a defect in the left paracentral position was seen. The defect was resected and extruded. Nuclear material presented itself. A micro nerve hook was used to remove multiple large extruded sequestered fragments until the dura was free and mobile and flat to the back of the vertebral body. No evidence of further stenosis evident. The disk space was mobilized.   There was slight uncinate prominence that was resected with a high-speed bur to allow a 15-mm wide device to sit flattened level on the endplate. A 15 x 5 x 13 implant was trialed and found to be excellent. The actual device was then tamped firmly into place under fluoroscopic guidance, placed well seated in the dome way back. It was perfectly centralized. Rotation was excellent. Well-fixed. The wound was irrigated. There was no apparent bleeding. Fluoroscopic imaging demonstrated a well-placed implant  seated. The wound was copiously irrigated. There was absolutely no bleeding. The Abiel pins had been removed, and the area filled with bone wax where the pin had entered. Vancomycin powder instilled for infection prophylaxis. The neck was closed in layers, and the skin closed with a subcuticular suture and Dermabond. Sterile occlusive dressing was placed upon the wound. All counts were correct.       MD DREA [email or web address]  D:  09/23/2020 12:51  T:  09/24/2020 0:39  JOB #:  [id number]

## 2020-09-28 ENCOUNTER — TELEPHONE (OUTPATIENT)
Dept: SURGICAL ICU | Age: 50
End: 2020-09-28

## 2020-09-28 NOTE — TELEPHONE ENCOUNTER
Post-op call completed. Patient reports uncontrolled pain over the weekend. She did contact the office and they adjusted her pain medication. Her follow-up appointment is scheduled for 10/8. No further questions or concerns at this time.     VOLODYMYR YuenN, RN, [facility]  Orthopedic

## 2020-09-29 ENCOUNTER — TELEPHONE (OUTPATIENT)
Dept: ORTHOPEDIC SURGERY | Age: 50
End: 2020-09-29

## 2020-09-29 DIAGNOSIS — Z98.890 S/P CERVICAL DISC REPLACEMENT: ICD-10-CM

## 2020-09-29 RX ORDER — OXYCODONE HYDROCHLORIDE 5 MG/1
5 TABLET ORAL
Qty: 20 TAB | Refills: 0 | Status: SHIPPED | OUTPATIENT
Start: 2020-09-29 | End: 2020-10-06

## 2020-09-29 NOTE — TELEPHONE ENCOUNTER
[phone] Patient [facility] Patient states she only has 1 oxycodone left and is requesting a refill. Please advise.

## 2020-09-29 NOTE — TELEPHONE ENCOUNTER
I sent in #20 oxycodone 5 mg to take q 8 hours as needed. Call pt and let her know this is the last opioid rx for her post op recovery. After this she will have to take tylenol.

## 2020-10-21 ENCOUNTER — OFFICE VISIT (OUTPATIENT)
Dept: ORTHOPEDIC SURGERY | Age: 50
End: 2020-10-21
Payer: MEDICAID

## 2020-10-21 VITALS
OXYGEN SATURATION: 97 % | TEMPERATURE: 98 F | DIASTOLIC BLOOD PRESSURE: 54 MMHG | WEIGHT: 132 LBS | HEART RATE: 74 BPM | BODY MASS INDEX: 23.39 KG/M2 | SYSTOLIC BLOOD PRESSURE: 89 MMHG | HEIGHT: 63 IN

## 2020-10-21 DIAGNOSIS — M54.12 CERVICAL RADICULOPATHY: ICD-10-CM

## 2020-10-21 DIAGNOSIS — Z98.890 S/P CERVICAL DISC REPLACEMENT: Primary | ICD-10-CM

## 2020-10-21 PROCEDURE — 99024 POSTOP FOLLOW-UP VISIT: CPT | Performed by: NURSE PRACTITIONER

## 2020-10-21 PROCEDURE — 72040 X-RAY EXAM NECK SPINE 2-3 VW: CPT | Performed by: NURSE PRACTITIONER

## 2020-10-21 NOTE — PROGRESS NOTES
[address]  [address], [address]  [place]  Phone: [phone]  Fax: [phone]    Spine Post-Op Office Visit Note    Patient: Mau Cook   MRN: [id number]     Age:  53 y.o.,      Sex: female    YOB: 1970     PCP: Kiko Barbosa MD    HISTORY OF PRESENT ILLNESS:  Chief Complaint   Patient presents with    Surgical Follow-up     neck     Landy Garcia is a 53 y.o.  female with history of neck pain. Patient had a C5/6 Disc Arthroplasty surgery about 4 weeks ago. Prior to surgery, she had progressive pain numbness and weakness in her left arm and she also noted loss of balance and ambulation issues. Today, the pre op pain is much improved. She is having a soreness in her posterior neck. Patient denies any bladder/bowel dysfunction, new onset weakness, or other neurological deficits. ASSESSMENT   Diagnoses and all orders for this visit:    1. S/P cervical disc replacement  -     AMB POC XRAY, SPINE, CERVICAL; 2 OR 3  -     AMB SUPPLY ORDER    2. Cervical radiculopathy  -     AMB POC XRAY, SPINE, CERVICAL; 2 OR 3  -     AMB SUPPLY ORDER            IMPRESSION AND PLAN   1) Pt was given information on neck exercises. 2) C xray today, disc in place  3) start HEP  4) OTC tylenol and motrin for pain  5) ESTIM  6 Wound care and activity level reviewed. 7) Ms. Meryl Lefort has a reminder for a \"due or due soon\" health maintenance. I have asked that she contact her primary care provider, Kiko Barbosa MD, for follow-up on this health maintenance. 8) We have informed the patient to notify us for immediate appointment if he has any worsening neurogical symptoms or if an emergency situation presents, then call 911  9)  demonstrated consistency with prescribing. 10) Pt will follow-up in 6 weeks.              SUBJECTIVE    Pain Scale: 4/10    Pain Assessment  10/21/2020   Location of Pain Neck   Location Modifiers - Severity of Pain 4   Quality of Pain Aching; Throbbing   Quality of Pain Comment -   Duration of Pain -   Frequency of Pain Intermittent   Aggravating Factors -   Aggravating Factors Comment -   Relieving Factors -   Relieving Factors Comment -   Result of Injury -          Past Medical History:   Diagnosis Date    Anxiety     Arthritis     Constipation     Depression 2014    Herniated disc     Poison oak 3/2013    to wrist       Past Surgical History:   Procedure Laterality Date    EXPLORATORY OF ABDOMEN      cervix x4     HX ANKLE FRACTURE TX Right     HX BREAST AUGMENTATION  1996    HX ORTHOPAEDIC      rt ankle fracture    HX OTHER SURGICAL      laproscopy       Current Outpatient Medications   Medication Sig Dispense Refill    aspirin-acetaminophen-caffeine (EXCEDRIN ES) 250-250-65 mg per tablet Take 1 Tab by mouth.  pregabalin (Lyrica) 150 mg capsule Take 1 Cap by mouth two (2) times a day. Max Daily Amount: 300 mg. (Patient taking differently: Take 150 mg by mouth daily.) 60 Cap 2    oxyCODONE-acetaminophen (Percocet) 5-325 mg per tablet Take 1 Tab by mouth two (2) times a day. PRN      promethazine (PHENERGAN) 12.5 mg tablet Take  by mouth every six (6) hours as needed for Nausea.          Allergies   Allergen Reactions    Midodrine Nausea and Vomiting     PATIENT DENIES    Morphine Itching     PATIENT DENIES    Naproxen Hives     PATIENT DENIES    Oxycodone Nausea and Vomiting     Patient states she is not allergic to this medication    Pcn [Penicillins] Hives and Rash    Sulfa (Sulfonamide Antibiotics) Nausea and Vomiting     PATIENT DENIES            OBJECTIVE    Vitals:    10/21/20 1429   BP: (!) 89/54   Pulse: 74   Temp: 98 °F (36.7 °C)   TempSrc: Oral   SpO2: 97%   Weight: 132 lb (59.9 kg)   Height: 5' 3\" (1.6 m)   PainSc:   4   PainLoc: Neck       Physical Exam:  General: alert, cooperative, no distress, appears stated age  Constitutional:  Well developed, well nourished, in no acute distress. Psychiatric: Affect and mood are appropriate. Integumentary: No rashes or abrasions noted on exposed areas. Wound: Incision healing well, has well approximated edges, no erythema, warmth, drainage or signs of infection. Cardiovascular/Peripheral Vascular: No peripheral edema is noted. Lymphatic:  No evidence of lymphedema. No cervical lymphadenopathy. MOTOR    Biceps  Triceps Deltoids Wrist Ext Wrist Flex Hand Intrin   Right +4/5 +4/5 +4/5 +4/5 +4/5 +4/5   Left +4/5 +4/5 +4/5 +4/5 +4/5 +4/5         normal gait and station      Ambulation without assistive device. full weight bearing, non-antalgic gait. Accompanied by self.       Grey Bourgeois NP  October 21, 2020  2:23 PM

## 2020-10-21 NOTE — PATIENT INSTRUCTIONS
Neck Arthritis: Exercises Introduction Here are some examples of exercises for you to try. The exercises may be suggested for a condition or for rehabilitation. Start each exercise slowly. Ease off the exercises if you start to have pain. You will be told when to start these exercises and which ones will work best for you. How to do the exercises Neck stretches to the side 1. This stretch works best if you keep your shoulder down as you lean away from it. To help you remember to do this, start by relaxing your shoulders and lightly holding on to your thighs or your chair. 2. Tilt your head toward your shoulder and hold for 15 to 30 seconds. Let the weight of your head stretch your muscles. 3. Repeat 2 to 4 times toward each shoulder. Chin tuck 1. Lie on the floor with a rolled-up towel under your neck. Your head should be touching the floor. 2. Slowly bring your chin toward your chest. 
3. Hold for a count of 6, and then relax for up to 10 seconds. 4. Repeat 8 to 12 times. Active cervical rotation 1. Sit in a firm chair, or stand up straight. 2. Keeping your chin level, turn your head to the right, and hold for 15 to 30 seconds. 3. Turn your head to the left and hold for 15 to 30 seconds. 4. Repeat 2 to 4 times to each side. Shoulder blade squeeze 1. While standing, squeeze your shoulder blades together. 2. Do not raise your shoulders up as you are squeezing. 3. Hold for 6 seconds. 4. Repeat 8 to 12 times. Shoulder rolls 1. Sit comfortably with your feet shoulder-width apart. You can also do this exercise standing up. 2. Roll your shoulders up, then back, and then down in a smooth, circular motion. 3. Repeat 2 to 4 times. Follow-up care is a key part of your treatment and safety. Be sure to make and go to all appointments, and call your doctor if you are having problems. It's also a good idea to know your test results and keep a list of the medicines you take. Where can you learn more? Go to http://www.gray.com/ Enter Z033 in the search box to learn more about \"Neck Arthritis: Exercises. \" Current as of: March 2, 2020               Content Version: 12.6 © 9174-9482 [employer]. Care instructions adapted under license by [employer] (which disclaims liability or warranty for this information). If you have questions about a medical condition or this instruction, always ask your healthcare professional. [address] any warranty or liability for your use of this information. Neck Spasm: Exercises Introduction Here are some examples of exercises for you to try. The exercises may be suggested for a condition or for rehabilitation. Start each exercise slowly. Ease off the exercises if you start to have pain. You will be told when to start these exercises and which ones will work best for you. How to do the exercises Levator scapula stretch 1. Sit in a firm chair, or stand up straight. 2. Gently tilt your head toward your left shoulder. 3. Turn your head to look down into your armpit, bending your head slightly forward. Let the weight of your head stretch your neck muscles. 4. Hold for 15 to 30 seconds. 5. Return to your starting position. 6. Follow the same instructions above, but tilt your head toward your right shoulder. 7. Repeat 2 to 4 times toward each shoulder. Upper trapezius stretch 1. Sit in a firm chair, or stand up straight. 2. This stretch works best if you keep your shoulder down as you lean away from it. To help you remember to do this, start by relaxing your shoulders and lightly holding on to your thighs or your chair. 3. Tilt your head toward your shoulder and hold for 15 to 30 seconds. Let the weight of your head stretch your muscles.  
4. If you would like a little added stretch, place your arm behind your back. Use the arm opposite of the direction you are tilting your head. For example, if you are tilting your head to the left, place your right arm behind your back. 5. Repeat 2 to 4 times toward each shoulder. Neck rotation 1. Sit in a firm chair, or stand up straight. 2. Keeping your chin level, turn your head to the right, and hold for 15 to 30 seconds. 3. Turn your head to the left, and hold for 15 to 30 seconds. 4. Repeat 2 to 4 times to each side. Chin tuck 1. Lie on the floor with a rolled-up towel under your neck. Your head should be touching the floor. 2. Slowly bring your chin toward the front of your neck. 3. Hold for a count of 6, and then relax for up to 10 seconds. 4. Repeat 8 to 12 times. Forward neck flexion 1. Sit in a firm chair, or stand up straight. 2. Bend your head forward. 3. Hold for 15 to 30 seconds, then return to your starting position. 4. Repeat 2 to 4 times. Follow-up care is a key part of your treatment and safety. Be sure to make and go to all appointments, and call your doctor if you are having problems. It's also a good idea to know your test results and keep a list of the medicines you take. Where can you learn more? Go to http://www.gray.com/ Enter P962 in the search box to learn more about \"Neck Spasm: Exercises. \" Current as of: March 2, 2020               Content Version: 12.6 © 2006-2020 [employer]. Care instructions adapted under license by [employer] (which disclaims liability or warranty for this information). If you have questions about a medical condition or this instruction, always ask your healthcare professional. [address] any warranty or liability for your use of this information. Neck: Exercises Introduction Here are some examples of exercises for you to try. The exercises may be suggested for a condition or for rehabilitation.  Start each exercise slowly. Ease off the exercises if you start to have pain. You will be told when to start these exercises and which ones will work best for you. How to do the exercises Neck stretch 1. This stretch works best if you keep your shoulder down as you lean away from it. To help you remember to do this, start by relaxing your shoulders and lightly holding on to your thighs or your chair. 2. Tilt your head toward your shoulder and hold for 15 to 30 seconds. Let the weight of your head stretch your muscles. 3. If you would like a little added stretch, use your hand to gently and steadily pull your head toward your shoulder. For example, keeping your right shoulder down, lean your head to the left. 4. Repeat 2 to 4 times toward each shoulder. Diagonal neck stretch 1. Turn your head slightly toward the direction you will be stretching, and tilt your head diagonally toward your chest and hold for 15 to 30 seconds. 2. If you would like a little added stretch, use your hand to gently and steadily pull your head forward on the diagonal. 
3. Repeat 2 to 4 times toward each side. Dorsal glide stretch The dorsal glide stretches the back of the neck. If you feel pain, do not glide so far back. Some people find this exercise easier to do while lying on their backs with an ice pack on the neck. 1. Sit or stand tall and look straight ahead. 2. Slowly tuck your chin as you glide your head backward over your body 3. Hold for a count of 6, and then relax for up to 10 seconds. 4. Repeat 8 to 12 times. Chest and shoulder stretch 1. Sit or stand tall and glide your head backward as in the dorsal glide stretch. 2. Raise both arms so that your hands are next to your ears. 3. Take a deep breath, and as you breathe out, lower your elbows down and behind your back. You will feel your shoulder blades slide down and together, and at the same time you will feel a stretch across your chest and the front of your shoulders. 4. Hold for about 6 seconds, and then relax for up to 10 seconds. 5. Repeat 8 to 12 times. Strengthening: Hands on head 1. Move your head backward, forward, and side to side against gentle pressure from your hands, holding each position for about 6 seconds. 2. Repeat 8 to 12 times. Follow-up care is a key part of your treatment and safety. Be sure to make and go to all appointments, and call your doctor if you are having problems. It's also a good idea to know your test results and keep a list of the medicines you take. Where can you learn more? Go to http://www.krishnamurthy.com/ Enter P975 in the search box to learn more about \"Neck: Exercises. \" Current as of: March 2, 2020               Content Version: 12.6 © 9158-0181 [employer]. Care instructions adapted under license by [employer] (which disclaims liability or warranty for this information). If you have questions about a medical condition or this instruction, always ask your healthcare professional. [address] any warranty or liability for your use of this information.

## (undated) DEVICE — INTENDED FOR TISSUE SEPARATION, AND OTHER PROCEDURES THAT REQUIRE A SHARP SURGICAL BLADE TO PUNCTURE OR CUT.: Brand: BARD-PARKER SAFETY BLADES SIZE 10, STERILE

## (undated) DEVICE — SUTURE VCRL SZ 3-0 L18IN ABSRB VLT L26MM SH 1/2 CIR J774D

## (undated) DEVICE — CERVICAL COLLAR: Brand: DEROYAL

## (undated) DEVICE — HEX-LOCKING BLADE ELECTRODE: Brand: EDGE

## (undated) DEVICE — FLEX ADVANTAGE 3000CC: Brand: FLEX ADVANTAGE

## (undated) DEVICE — GELFOAM SZ 100 SPNG

## (undated) DEVICE — BLANKET WRM AD W50XL85.8IN PACU FULL BODY FORC AIR

## (undated) DEVICE — SUTURE MCRYL SZ 4-0 L18IN ABSRB UD L19MM PS-2 3/8 CIR PRIM Y496G

## (undated) DEVICE — SPIROMETER INCENT 2500ML W ONE W VLV

## (undated) DEVICE — Device

## (undated) DEVICE — STOCKING ANTIEMB KNEE SM REG --

## (undated) DEVICE — INTENDED FOR TISSUE SEPARATION, AND OTHER PROCEDURES THAT REQUIRE A SHARP SURGICAL BLADE TO PUNCTURE OR CUT.: Brand: BARD-PARKER SAFETY BLADES SIZE 15, STERILE

## (undated) DEVICE — SPONGE DISSECT PNUT SM 3/8IN -- 5/PK

## (undated) DEVICE — SYRINGE MED 3ML NDL 22GA L1 1/2IN REG BVL SFGLDE

## (undated) DEVICE — SOLUTION IV 1000ML 0.9% SOD CHL

## (undated) DEVICE — X-RAY SPONGES,12 PLY: Brand: DERMACEA

## (undated) DEVICE — REM POLYHESIVE ADULT PATIENT RETURN ELECTRODE: Brand: VALLEYLAB

## (undated) DEVICE — INSULATED BLADE ELECTRODE: Brand: EDGE

## (undated) DEVICE — APPLICATOR BNDG 1MM ADH PREMIERPRO EXOFIN

## (undated) DEVICE — WAX SURG 2.5GM HEMSTAT BNE BEESWAX PARAFFIN ISO PALMITATE

## (undated) DEVICE — GARMENT,MEDLINE,DVT,SEQUENTIAL,CALF,MD: Brand: MEDLINE